# Patient Record
Sex: FEMALE | Race: WHITE | NOT HISPANIC OR LATINO
[De-identification: names, ages, dates, MRNs, and addresses within clinical notes are randomized per-mention and may not be internally consistent; named-entity substitution may affect disease eponyms.]

---

## 2017-04-24 ENCOUNTER — APPOINTMENT (OUTPATIENT)
Dept: SURGERY | Facility: CLINIC | Age: 37
End: 2017-04-24

## 2017-08-07 ENCOUNTER — APPOINTMENT (OUTPATIENT)
Dept: OTOLARYNGOLOGY | Facility: CLINIC | Age: 37
End: 2017-08-07

## 2018-04-16 ENCOUNTER — APPOINTMENT (OUTPATIENT)
Dept: SURGERY | Facility: CLINIC | Age: 38
End: 2018-04-16
Payer: COMMERCIAL

## 2018-04-16 PROCEDURE — 99214 OFFICE O/P EST MOD 30 MIN: CPT

## 2019-04-22 ENCOUNTER — APPOINTMENT (OUTPATIENT)
Dept: ENDOCRINOLOGY | Facility: CLINIC | Age: 39
End: 2019-04-22
Payer: COMMERCIAL

## 2019-04-22 VITALS
WEIGHT: 167 LBS | HEART RATE: 73 BPM | OXYGEN SATURATION: 99 % | DIASTOLIC BLOOD PRESSURE: 60 MMHG | BODY MASS INDEX: 28.51 KG/M2 | SYSTOLIC BLOOD PRESSURE: 104 MMHG | RESPIRATION RATE: 16 BRPM | HEIGHT: 64 IN

## 2019-04-22 PROCEDURE — 99214 OFFICE O/P EST MOD 30 MIN: CPT

## 2019-04-22 NOTE — ASSESSMENT
[FreeTextEntry1] : - cont synthroid 125mcg + trial of cytomel 5mcg qd. R+B reviewed\par - desired TSH range (0.1-0.5)\par - Thyroid bed US in 04/20, or sooner prn\par - tft's in 2 mos, along with Tg/Tg ab and reassess.

## 2019-04-22 NOTE — HISTORY OF PRESENT ILLNESS
[FreeTextEntry1] : 38 yo female f/u for hypothyroidism/PTC management. \par \par still feels tired, mood swings , feels depressed. inquiring re: cytomel/ t4+T3 combo. still  on WW, exercises, but gained 10 lbs over the year.\par uses CBD oil at bedtime, helps a bit with sleep\par \par still on synthroid 125mcg. Tirosint was expensive.\par \par Thyr US (4/3/19)- s/p total tx. no susp findings. stable Rt submandibular LN ~ 2.0 cm.\par Thyr US (4/4/18)- s/p total tx. no susp findings\par Thyr US (4/12/17)- s/p total tx. no susp findings\par \par \par no depression (was switched to OTC-lo, then back to generic). 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 Using retainers. Started using "Natural calm" (magnesium supplements) with good help. \par \par \par Seeing Dr. Birmingham yearly\par TSH - 0.07 <-- 0.02 <--0.04 <--0.02 <-- 0.09 <-- 0.02 <--0.03, \par FT4- 1.78 <-- 1.8 <--1.5 <--1.6 <-- 1.6 <-- 1.5 <--1.7\par neg tg/tg ab\par Labs (6/13/13)- TSH 0.02, FT4- 1.6, Neg Tg/Tg ab\par on 175 mcg of LT4 (brand).   \par \par \par Thyr bed US ( 4/13/16) - s/p total tx. no susp findings. Stable benign reactive 1.7cm LN in the Rt submandibular region.\par \par FNA (4/17/15) of Rt submandibular LN- reactive LN. \par Tg from LN aspirate < 1.0\par \par \par \par Thyr bed US (4/8/15)- several benign LN adjacent to the thyr bed. prominent 1.9cm LN in rt submandibular area (prev 1.5xm in 2013) \par Thyr bed US (4/8/14)- no suspicious lesions. No CELESTE\par Thyr bed US (4/16/13) - no residual/recurrent thyroid tissue.   prior Tg/Tg ab neg.   \par 25-D- wnl.   celiac panel is neg.   \par thyrogen stim WBS (6/21/13)- 0% uptake and undetectable stim Tg/ Tg ab\par \par  H/o:\par Diagnosed with PTC in 04/2012.   S/p total tx on 04/16/12 at [facility] (Dr. Birmingham).   S/p I- 131 ( 125mci ) at [facility] on 06/20/12.   The dose was fluctuating b/w 137- 175 mcg.   Denies family h/o thyroid cancer or history of radiation exposure to head and neck area in a childhood.   \par \par Path: multifocal -  Rt lobe 1.1cm well-diff  PTC with focal extrathyroidal extension, + LV invasion, + 3/7 LN;.   Rt lobe 0.6cm well-diff  PTC, no LV invasion, + extrathyroidal extension.   T3N1Mx.   \par \par Post treatment WBS (6/22/12)-  no evidence of distant mets.   Denies any c/o post ablation tx.

## 2019-04-23 ENCOUNTER — TRANSCRIPTION ENCOUNTER (OUTPATIENT)
Age: 39
End: 2019-04-23

## 2019-07-22 ENCOUNTER — APPOINTMENT (OUTPATIENT)
Dept: ENDOCRINOLOGY | Facility: CLINIC | Age: 39
End: 2019-07-22
Payer: COMMERCIAL

## 2019-07-22 VITALS
SYSTOLIC BLOOD PRESSURE: 102 MMHG | HEIGHT: 64 IN | DIASTOLIC BLOOD PRESSURE: 60 MMHG | BODY MASS INDEX: 28.68 KG/M2 | WEIGHT: 168 LBS | HEART RATE: 67 BPM | RESPIRATION RATE: 16 BRPM | OXYGEN SATURATION: 97 %

## 2019-07-22 PROCEDURE — 99214 OFFICE O/P EST MOD 30 MIN: CPT

## 2019-07-22 NOTE — HISTORY OF PRESENT ILLNESS
[FreeTextEntry1] : 38 yo female f/u for hypothyroidism/PTC management. \par \par ***7/22/19***\par took cytomel for 1 month. Felt much better, but had to stop b/o severe anxiety\par feels ok on synthroid 125mcg\par TSH- 0.446, FT4- 1.6, T3- 145\par Tg < 0.1\par \par \par ***4/22/19***\par still feels tired, mood swings , feels depressed. inquiring re: cytomel/ t4+T3 combo. still  on WW, exercises, but gained 10 lbs over the year.\par uses CBD oil at bedtime, helps a bit with sleep\par \par still on synthroid 125mcg. Tirosint was expensive.\par \par Thyr US (4/3/19)- s/p total tx. no susp findings. stable Rt submandibular LN ~ 2.0 cm.\par Thyr US (4/4/18)- s/p total tx. no susp findings\par Thyr US (4/12/17)- s/p total tx. no susp findings\par \par \par no depression (was switched to OTC-lo, then back to generic). 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 Using retainers. Started using "Natural calm" (magnesium supplements) with good help. \par \par \par Seeing Dr. Birmingham yearly\par TSH - 0.07 <-- 0.02 <--0.04 <--0.02 <-- 0.09 <-- 0.02 <--0.03, \par FT4- 1.78 <-- 1.8 <--1.5 <--1.6 <-- 1.6 <-- 1.5 <--1.7\par neg tg/tg ab\par Labs (6/13/13)- TSH 0.02, FT4- 1.6, Neg Tg/Tg ab\par on 175 mcg of LT4 (brand).   \par \par \par Thyr bed US ( 4/13/16) - s/p total tx. no susp findings. Stable benign reactive 1.7cm LN in the Rt submandibular region.\par \par FNA (4/17/15) of Rt submandibular LN- reactive LN. \par Tg from LN aspirate < 1.0\par \par \par \par Thyr bed US (4/8/15)- several benign LN adjacent to the thyr bed. prominent 1.9cm LN in rt submandibular area (prev 1.5xm in 2013) \par Thyr bed US (4/8/14)- no suspicious lesions. No CELESTE\par Thyr bed US (4/16/13) - no residual/recurrent thyroid tissue.   prior Tg/Tg ab neg.   \par 25-D- wnl.   celiac panel is neg.   \par thyrogen stim WBS (6/21/13)- 0% uptake and undetectable stim Tg/ Tg ab\par \par  H/o:\par Diagnosed with PTC in 04/2012.   S/p total tx on 04/16/12 at [facility] (Dr. Birmingham).   S/p I- 131 ( 125mci ) at [facility] on 06/20/12.   The dose was fluctuating b/w 137- 175 mcg.   Denies family h/o thyroid cancer or history of radiation exposure to head and neck area in a childhood.   \par \par Path: multifocal -  Rt lobe 1.1cm well-diff  PTC with focal extrathyroidal extension, + LV invasion, + 3/7 LN;.   Rt lobe 0.6cm well-diff  PTC, no LV invasion, + extrathyroidal extension.   T3N1Mx.   \par \par Post treatment WBS (6/22/12)-  no evidence of distant mets.   Denies any c/o post ablation tx.

## 2019-07-22 NOTE — ASSESSMENT
[Levothyroxine] : The patient was instructed to take Levothyroxine on an empty stomach, separate from vitamins, and wait at least 30 minutes before eating [FreeTextEntry1] : - discussed poss retrying cytomel and  lower dose of synthroid vs observing on current dose of synthroid\par - mutually agreed to stay synthroid 125mcg for now\par - she'll reconsider Tirosint (previously high cost)\par - desired TSH range (0.1-0.5)\par - Thyroid bed US in 04/20, or sooner prn\par RTC 6 mos, or sooner

## 2020-01-29 ENCOUNTER — RX RENEWAL (OUTPATIENT)
Age: 40
End: 2020-01-29

## 2020-07-27 ENCOUNTER — APPOINTMENT (OUTPATIENT)
Dept: ENDOCRINOLOGY | Facility: CLINIC | Age: 40
End: 2020-07-27
Payer: COMMERCIAL

## 2020-07-27 VITALS
RESPIRATION RATE: 16 BRPM | SYSTOLIC BLOOD PRESSURE: 110 MMHG | HEIGHT: 64 IN | WEIGHT: 172 LBS | OXYGEN SATURATION: 98 % | BODY MASS INDEX: 29.37 KG/M2 | DIASTOLIC BLOOD PRESSURE: 60 MMHG | HEART RATE: 86 BPM

## 2020-07-27 PROCEDURE — 99214 OFFICE O/P EST MOD 30 MIN: CPT

## 2020-07-27 RX ORDER — LIOTHYRONINE SODIUM 5 UG/1
5 TABLET ORAL DAILY
Qty: 30 | Refills: 5 | Status: DISCONTINUED | COMMUNITY
Start: 2019-04-22 | End: 2020-07-27

## 2020-07-27 NOTE — ASSESSMENT
[Levothyroxine] : The patient was instructed to take Levothyroxine on an empty stomach, separate from vitamins, and wait at least 30 minutes before eating [FreeTextEntry1] : - discussed poss retrying cytomel and  lower dose of synthroid vs observing on current dose of synthroid\par - mutually agreed to stay synthroid, but will incr the dose to 137mcg for now\par - she'll reconsider Tirosint (previously high cost)\par - desired TSH range (0.1-0.5)\par -  we discussed that her palpable neck LN is likely reactive in nature, but will do a neck US. If any further suspicions - for neck CT\par RTC 3 mos, or sooner

## 2020-07-27 NOTE — HISTORY OF PRESENT ILLNESS
[FreeTextEntry1] : 39 yo female f/u for hypothyroidism/PTC management. \par \par *** Jul 27, 2020 ***\par \par several weeks ago developed right jaw and neck pain, felt run down, then felt a large right posterior neck LN. SInce then LN got smaller \par on Synthroid 125 mcg\par TSH-1.68, FT4- 1.3\par neg Tg/Tg ab\par 25D- 38\par \par Thyr bed/neck US (6/8/20)- s/p total tx. no suspicious findings. Stable right submandibular LN (prev FNA'd)\par \par ***7/22/19***\par took cytomel for 1 month. Felt much better, but had to stop b/o severe anxiety\par feels ok on synthroid 125mcg\par TSH- 0.446, FT4- 1.6, T3- 145\par Tg < 0.1\par \par \par ***4/22/19***\par still feels tired, mood swings , feels depressed. inquiring re: cytomel/ t4+T3 combo. still  on WW, exercises, but gained 10 lbs over the year.\par uses CBD oil at bedtime, helps a bit with sleep\par \par still on synthroid 125mcg. Tirosint was expensive.\par \par Thyr US (4/3/19)- s/p total tx. no susp findings. stable Rt submandibular LN ~ 2.0 cm.\par Thyr US (4/4/18)- s/p total tx. no susp findings\par Thyr US (4/12/17)- s/p total tx. no susp findings\par \par \par no depression (was switched to OTC-lo, then back to generic). 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 Using retainers. Started using "Natural calm" (magnesium supplements) with good help. \par \par \par Seeing Dr. Birmingham yearly\par TSH - 0.07 <-- 0.02 <--0.04 <--0.02 <-- 0.09 <-- 0.02 <--0.03, \par FT4- 1.78 <-- 1.8 <--1.5 <--1.6 <-- 1.6 <-- 1.5 <--1.7\par neg tg/tg ab\par Labs (6/13/13)- TSH 0.02, FT4- 1.6, Neg Tg/Tg ab\par on 175 mcg of LT4 (brand).   \par \par \par Thyr bed US ( 4/13/16) - s/p total tx. no susp findings. Stable benign reactive 1.7cm LN in the Rt submandibular region.\par \par FNA (4/17/15) of Rt submandibular LN- reactive LN. \par Tg from LN aspirate < 1.0\par \par \par \par Thyr bed US (4/8/15)- several benign LN adjacent to the thyr bed. prominent 1.9cm LN in rt submandibular area (prev 1.5xm in 2013) \par Thyr bed US (4/8/14)- no suspicious lesions. No CELESTE\par Thyr bed US (4/16/13) - no residual/recurrent thyroid tissue.   prior Tg/Tg ab neg.   \par 25-D- wnl.   celiac panel is neg.   \par thyrogen stim WBS (6/21/13)- 0% uptake and undetectable stim Tg/ Tg ab\par \par  H/o:\par Diagnosed with PTC in 04/2012.   S/p total tx on 04/16/12 at [facility] (Dr. Birmingham).   S/p I- 131 ( 125mci ) at [facility] on 06/20/12.   The dose was fluctuating b/w 137- 175 mcg.   Denies family h/o thyroid cancer or history of radiation exposure to head and neck area in a childhood.   \par \par Path: multifocal -  Rt lobe 1.1cm well-diff  PTC with focal extrathyroidal extension, + LV invasion, + 3/7 LN;.   Rt lobe 0.6cm well-diff  PTC, no LV invasion, + extrathyroidal extension.   T3N1Mx.   \par \par Post treatment WBS (6/22/12)-  no evidence of distant mets.   Denies any c/o post ablation tx.

## 2020-07-28 ENCOUNTER — TRANSCRIPTION ENCOUNTER (OUTPATIENT)
Age: 40
End: 2020-07-28

## 2020-09-08 ENCOUNTER — TRANSCRIPTION ENCOUNTER (OUTPATIENT)
Age: 40
End: 2020-09-08

## 2020-10-01 ENCOUNTER — TRANSCRIPTION ENCOUNTER (OUTPATIENT)
Age: 40
End: 2020-10-01

## 2020-10-22 ENCOUNTER — APPOINTMENT (OUTPATIENT)
Dept: SURGERY | Facility: CLINIC | Age: 40
End: 2020-10-22
Payer: COMMERCIAL

## 2020-10-22 PROCEDURE — 99213 OFFICE O/P EST LOW 20 MIN: CPT

## 2020-10-22 NOTE — HISTORY OF PRESENT ILLNESS
[de-identified] : Patient diagnosed with papillary thyroid cancer April 2012 with pathology 1.1 cm, 3 of 7 lymph nodes positive for metastatic disease, T3N1. Patient denies dysphasia, hoarseness, fatigue or palpitations. Head neck ultrasound April 2015 with right submandibular lymph node biopsy negative. No additional imaging performed. Blood work from Dr. Burks : TSH 0.04, free T4 1.5, thyroglobulin negative without antibodies. Patient denies recent illness.\par Patient had neck ultrasound this morning with submandibular lymph node decreased to 1.5. repeat last week 2 cm without suspicious findings,   Blood work from last week normal with negative TG. Patient denies dysphagia, palpitations, or recent illness. Patient reports mild fatigue and difficulty loosing weight.  Currently on Synthroid 125 . seeing Dr Burks next week will discuss possible cytomel. \par Patient with right jaw/cheek discomfort 7/2020.  US 7/2020 with right level 5 LN, decreased on US 10/2020 probable reactive. had min last week pending.  feeling well. reports prior TGs negative.

## 2020-10-22 NOTE — PHYSICAL EXAM
[de-identified] : right posterior  cervical adenopathy flat, soft, non tender, trachea midline, no palpable masses. [Normal] : orientation to person, place, and time: normal

## 2020-10-23 ENCOUNTER — TRANSCRIPTION ENCOUNTER (OUTPATIENT)
Age: 40
End: 2020-10-23

## 2020-10-26 ENCOUNTER — APPOINTMENT (OUTPATIENT)
Dept: SURGERY | Facility: CLINIC | Age: 40
End: 2020-10-26

## 2020-10-27 ENCOUNTER — APPOINTMENT (OUTPATIENT)
Dept: ENDOCRINOLOGY | Facility: CLINIC | Age: 40
End: 2020-10-27
Payer: COMMERCIAL

## 2020-10-27 PROCEDURE — 99214 OFFICE O/P EST MOD 30 MIN: CPT | Mod: 95

## 2020-10-27 NOTE — ASSESSMENT
[Levothyroxine] : The patient was instructed to take Levothyroxine on an empty stomach, separate from vitamins, and wait at least 30 minutes before eating [FreeTextEntry1] : - discussed poss retrying cytomel and  lower dose of synthroid vs observing on current dose of synthroid\par - mutually agreed to stay synthroid, change to 137mcg 6.5/wk\par - she'll reconsider Tirosint (previously high cost)\par - desired TSH range (0.1-0.5)\par -palpable neck LN are likely reactive in nature, rpt neck US in 6 mos. If any further suspicions - for neck CT\par RTC 6 mos, or sooner

## 2020-10-27 NOTE — HISTORY OF PRESENT ILLNESS
[Home] : at home, [unfilled] , at the time of the visit. [Medical Office: ([facility])___] : at the medical office located in  [Verbal consent obtained from patient] : the patient, [unfilled] [FreeTextEntry1] : 41 yo female f/u for hypothyroidism/PTC management. \par \par *** Televisit  Oct 27, 2020 ***\par \par on Synthroid 137 mcg. feels well.\par TSH- 0.12, FT4- 1.4, neg Tg ab (no Tg are run)\par \par Thyr US (10/16/20)- right posterior cervical chain LN 1.1 +0.8 +1.0 LN with fatty hilum. Smaller in size and c/w reactive CELESTE. previously biopsied right submandibular LN is also unchanged\par \par *** Jul 27, 2020 ***\par \par several weeks ago developed right jaw and neck pain, felt run down, then felt a large right posterior neck LN. SInce then LN got smaller \par on Synthroid 125 mcg\par TSH-1.68, FT4- 1.3\par neg Tg/Tg ab\par 25D- 38\par \par Thyr bed/neck US (6/8/20)- s/p total tx. no suspicious findings. Stable right submandibular LN (prev FNA'd)\par \par ***7/22/19***\par took cytomel for 1 month. Felt much better, but had to stop b/o severe anxiety\par feels ok on synthroid 125mcg\par TSH- 0.446, FT4- 1.6, T3- 145\par Tg < 0.1\par \par \par ***4/22/19***\par still feels tired, mood swings , feels depressed. inquiring re: cytomel/ t4+T3 combo. still  on WW, exercises, but gained 10 lbs over the year.\par uses CBD oil at bedtime, helps a bit with sleep\par \par still on synthroid 125mcg. Tirosint was expensive.\par \par Thyr US (4/3/19)- s/p total tx. no susp findings. stable Rt submandibular LN ~ 2.0 cm.\par Thyr US (4/4/18)- s/p total tx. no susp findings\par Thyr US (4/12/17)- s/p total tx. no susp findings\par \par \par no depression (was switched to OTC-lo, then back to generic). 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 Using retainers. Started using "Natural calm" (magnesium supplements) with good help. \par \par \par Seeing Dr. Birmingham yearly\par TSH - 0.07 <-- 0.02 <--0.04 <--0.02 <-- 0.09 <-- 0.02 <--0.03, \par FT4- 1.78 <-- 1.8 <--1.5 <--1.6 <-- 1.6 <-- 1.5 <--1.7\par neg tg/tg ab\par Labs (6/13/13)- TSH 0.02, FT4- 1.6, Neg Tg/Tg ab\par on 175 mcg of LT4 (brand).   \par \par \par Thyr bed US ( 4/13/16) - s/p total tx. no susp findings. Stable benign reactive 1.7cm LN in the Rt submandibular region.\par \par FNA (4/17/15) of Rt submandibular LN- reactive LN. \par Tg from LN aspirate < 1.0\par \par \par \par Thyr bed US (4/8/15)- several benign LN adjacent to the thyr bed. prominent 1.9cm LN in rt submandibular area (prev 1.5xm in 2013) \par Thyr bed US (4/8/14)- no suspicious lesions. No CELESTE\par Thyr bed US (4/16/13) - no residual/recurrent thyroid tissue.   prior Tg/Tg ab neg.   \par 25-D- wnl.   celiac panel is neg.   \par thyrogen stim WBS (6/21/13)- 0% uptake and undetectable stim Tg/ Tg ab\par \par  H/o:\par Diagnosed with PTC in 04/2012.   S/p total tx on 04/16/12 at [facility] (Dr. Birmingham).   S/p I- 131 ( 125mci ) at [facility] on 06/20/12.   The dose was fluctuating b/w 137- 175 mcg.   Denies family h/o thyroid cancer or history of radiation exposure to head and neck area in a childhood.   \par \par Path: multifocal -  Rt lobe 1.1cm well-diff  PTC with focal extrathyroidal extension, + LV invasion, + 3/7 LN;.   Rt lobe 0.6cm well-diff  PTC, no LV invasion, + extrathyroidal extension.   T3N1Mx.   \par \par Post treatment WBS (6/22/12)-  no evidence of distant mets.   Denies any c/o post ablation tx.

## 2021-04-19 ENCOUNTER — TRANSCRIPTION ENCOUNTER (OUTPATIENT)
Age: 41
End: 2021-04-19

## 2021-04-23 ENCOUNTER — TRANSCRIPTION ENCOUNTER (OUTPATIENT)
Age: 41
End: 2021-04-23

## 2021-04-23 DIAGNOSIS — Z00.00 ENCOUNTER FOR GENERAL ADULT MEDICAL EXAMINATION W/OUT ABNORMAL FINDINGS: ICD-10-CM

## 2021-05-13 ENCOUNTER — TRANSCRIPTION ENCOUNTER (OUTPATIENT)
Age: 41
End: 2021-05-13

## 2021-06-23 ENCOUNTER — TRANSCRIPTION ENCOUNTER (OUTPATIENT)
Age: 41
End: 2021-06-23

## 2021-07-13 ENCOUNTER — APPOINTMENT (OUTPATIENT)
Dept: ENDOCRINOLOGY | Facility: CLINIC | Age: 41
End: 2021-07-13
Payer: COMMERCIAL

## 2021-07-13 VITALS
DIASTOLIC BLOOD PRESSURE: 62 MMHG | OXYGEN SATURATION: 98 % | TEMPERATURE: 97.9 F | WEIGHT: 177 LBS | HEIGHT: 64 IN | BODY MASS INDEX: 30.22 KG/M2 | HEART RATE: 67 BPM | RESPIRATION RATE: 16 BRPM | SYSTOLIC BLOOD PRESSURE: 100 MMHG

## 2021-07-13 PROCEDURE — 99214 OFFICE O/P EST MOD 30 MIN: CPT

## 2021-07-13 PROCEDURE — 99072 ADDL SUPL MATRL&STAF TM PHE: CPT

## 2021-07-13 NOTE — HISTORY OF PRESENT ILLNESS
[FreeTextEntry1] : 40 yo female f/u for hypothyroidism/PTC management. \par \par *** Jul 13, 2021 ***\par \par on Synthroid 137 mcg  6.5/wk\par TSH- 0.54, FT4- 1.5\par neg Tg/Tg ab\par \par Thyr US (6/18/21)- 2.1 cm mid right lateral neck region\par s/p FNA (6/24/21)- favoring reactive lymph node\par \par \par *** Televisit  Oct 27, 2020 ***\par \par on Synthroid 137 mcg. feels well.\par TSH- 0.12, FT4- 1.4, neg Tg ab (no Tg are run)\par \par Thyr US (10/16/20)- right posterior cervical chain LN 1.1 +0.8 +1.0 LN with fatty hilum. Smaller in size and c/w reactive CELESTE. previously biopsied right submandibular LN is also unchanged\par \par *** Jul 27, 2020 ***\par \par several weeks ago developed right jaw and neck pain, felt run down, then felt a large right posterior neck LN. SInce then LN got smaller \par on Synthroid 125 mcg\par TSH-1.68, FT4- 1.3\par neg Tg/Tg ab\par 25D- 38\par \par Thyr bed/neck US (6/8/20)- s/p total tx. no suspicious findings. Stable right submandibular LN (prev FNA'd)\par \par ***7/22/19***\par took cytomel for 1 month. Felt much better, but had to stop b/o severe anxiety\par feels ok on synthroid 125mcg\par TSH- 0.446, FT4- 1.6, T3- 145\par Tg < 0.1\par \par \par ***4/22/19***\par still feels tired, mood swings , feels depressed. inquiring re: cytomel/ t4+T3 combo. still  on WW, exercises, but gained 10 lbs over the year.\par uses CBD oil at bedtime, helps a bit with sleep\par \par still on synthroid 125mcg. Tirosint was expensive.\par \par Thyr US (4/3/19)- s/p total tx. no susp findings. stable Rt submandibular LN ~ 2.0 cm.\par Thyr US (4/4/18)- s/p total tx. no susp findings\par Thyr US (4/12/17)- s/p total tx. no susp findings\par \par \par no depression (was switched to OTC-lo, then back to generic). 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 Using retainers. Started using "Natural calm" (magnesium supplements) with good help. \par \par \par Seeing Dr. Birmingham yearly\par TSH - 0.07 <-- 0.02 <--0.04 <--0.02 <-- 0.09 <-- 0.02 <--0.03, \par FT4- 1.78 <-- 1.8 <--1.5 <--1.6 <-- 1.6 <-- 1.5 <--1.7\par neg tg/tg ab\par Labs (6/13/13)- TSH 0.02, FT4- 1.6, Neg Tg/Tg ab\par on 175 mcg of LT4 (brand).   \par \par \par Thyr bed US ( 4/13/16) - s/p total tx. no susp findings. Stable benign reactive 1.7cm LN in the Rt submandibular region.\par \par FNA (4/17/15) of Rt submandibular LN- reactive LN. \par Tg from LN aspirate < 1.0\par \par \par \par Thyr bed US (4/8/15)- several benign LN adjacent to the thyr bed. prominent 1.9cm LN in rt submandibular area (prev 1.5xm in 2013) \par Thyr bed US (4/8/14)- no suspicious lesions. No CELESTE\par Thyr bed US (4/16/13) - no residual/recurrent thyroid tissue.   prior Tg/Tg ab neg.   \par 25-D- wnl.   celiac panel is neg.   \par thyrogen stim WBS (6/21/13)- 0% uptake and undetectable stim Tg/ Tg ab\par \par  H/o:\par Diagnosed with PTC in 04/2012.   S/p total tx on 04/16/12 at [facility] (Dr. Birmingham).   S/p I- 131 ( 125mci ) at [facility] on 06/20/12.   The dose was fluctuating b/w 137- 175 mcg.   Denies family h/o thyroid cancer or history of radiation exposure to head and neck area in a childhood.   \par \par Path: multifocal -  Rt lobe 1.1cm well-diff  PTC with focal extrathyroidal extension, + LV invasion, + 3/7 LN;.   Rt lobe 0.6cm well-diff  PTC, no LV invasion, + extrathyroidal extension.   T3N1Mx.   \par \par Post treatment WBS (6/22/12)-  no evidence of distant mets.   Denies any c/o post ablation tx.

## 2021-07-13 NOTE — ASSESSMENT
[Levothyroxine] : The patient was instructed to take Levothyroxine on an empty stomach, separate from vitamins, and wait at least 30 minutes before eating [FreeTextEntry1] : - discussed poss retrying cytomel and  lower dose of synthroid vs observing on current dose of synthroid\par - mutually agreed to keep synthroid 137mcg 6.5/wk\par - she'll reconsider Tirosint (previously high cost)\par - desired TSH range (0.1-0.5)\par -palpable neck LN are likely reactive in nature, rpt neck US in 6 mos. If any further suspicions - for neck CT\par - f/u Dr. Birmingham\par RTC 6 mos, or sooner

## 2021-08-03 ENCOUNTER — APPOINTMENT (OUTPATIENT)
Dept: SURGERY | Facility: CLINIC | Age: 41
End: 2021-08-03
Payer: COMMERCIAL

## 2021-08-03 PROCEDURE — 99213 OFFICE O/P EST LOW 20 MIN: CPT

## 2021-08-03 NOTE — ASSESSMENT
[FreeTextEntry1] : h/o thyroid cancer and recent  reactive LNs, no suspicious findings on PE.f/u US 12/2021   ,  RTO  6 mo

## 2021-08-03 NOTE — HISTORY OF PRESENT ILLNESS
[de-identified] : Patient diagnosed with papillary thyroid cancer April 2012 with pathology 1.1 cm, 3 of 7 lymph nodes positive for metastatic disease, T3N1. Patient denies dysphasia, hoarseness, fatigue or palpitations. Head neck ultrasound April 2015 with right submandibular lymph node biopsy negative. No additional imaging performed. Blood work from Dr. Burks : TSH 0.04, free T4 1.5, thyroglobulin negative without antibodies. Patient denies recent illness.\par Patient had neck ultrasound this morning with submandibular lymph node decreased to 1.5. repeat last week 2 cm without suspicious findings,   Blood work from last week normal with negative TG. Patient denies dysphagia, palpitations, or recent illness. Patient reports mild fatigue and difficulty loosing weight.  Currently on Synthroid 125 . seeing Dr Burks next week will discuss possible cytomel. \par Patient with right jaw/cheek discomfort 7/2020.  US 7/2020 with right level 5 LN, decreased on US 10/2020 probable reactive. had min 6/2021 in good range,   feeling well. neck US 6/2021 with right level 3 LN , FNA benign per patient. will obtain report.  I have reviewed all old and new data and available images.

## 2021-08-03 NOTE — PHYSICAL EXAM
[de-identified] : right posterior  cervical adenopathy flat, soft, non tender, trachea midline, no palpable masses. [Normal] : orientation to person, place, and time: normal

## 2021-08-26 ENCOUNTER — TRANSCRIPTION ENCOUNTER (OUTPATIENT)
Age: 41
End: 2021-08-26

## 2021-11-24 ENCOUNTER — TRANSCRIPTION ENCOUNTER (OUTPATIENT)
Age: 41
End: 2021-11-24

## 2021-12-05 ENCOUNTER — TRANSCRIPTION ENCOUNTER (OUTPATIENT)
Age: 41
End: 2021-12-05

## 2022-01-13 ENCOUNTER — APPOINTMENT (OUTPATIENT)
Dept: ENDOCRINOLOGY | Facility: CLINIC | Age: 42
End: 2022-01-13
Payer: COMMERCIAL

## 2022-01-13 VITALS
RESPIRATION RATE: 16 BRPM | TEMPERATURE: 98.7 F | BODY MASS INDEX: 29.53 KG/M2 | WEIGHT: 173 LBS | OXYGEN SATURATION: 98 % | HEIGHT: 64 IN | SYSTOLIC BLOOD PRESSURE: 120 MMHG | HEART RATE: 72 BPM | DIASTOLIC BLOOD PRESSURE: 60 MMHG

## 2022-01-13 DIAGNOSIS — R59.0 LOCALIZED ENLARGED LYMPH NODES: ICD-10-CM

## 2022-01-13 PROCEDURE — 99214 OFFICE O/P EST MOD 30 MIN: CPT

## 2022-01-13 NOTE — HISTORY OF PRESENT ILLNESS
[FreeTextEntry1] : 42 yo female f/u for hypothyroidism/PTC management. \par \par *** Jan 13, 2022 ***\par \par on Synthroid 137 mcg  6.5/wk\par TSH- 0.53,  FT4- 1.5, neg tg/tg ab\par no repeat thyroid US yet\par \par *** Jul 13, 2021 ***\par \par on Synthroid 137 mcg  6.5/wk\par TSH- 0.54, FT4- 1.5\par neg Tg/Tg ab\par \par Thyr US (6/18/21)- 2.1 cm mid right lateral neck region\par s/p FNA (6/24/21)- favoring reactive lymph node\par \par \par *** Televisit  Oct 27, 2020 ***\par \par on Synthroid 137 mcg. feels well.\par TSH- 0.12, FT4- 1.4, neg Tg ab (no Tg are run)\par \par Thyr US (10/16/20)- right posterior cervical chain LN 1.1 +0.8 +1.0 LN with fatty hilum. Smaller in size and c/w reactive CELESTE. previously biopsied right submandibular LN is also unchanged\par \par *** Jul 27, 2020 ***\par \par several weeks ago developed right jaw and neck pain, felt run down, then felt a large right posterior neck LN. SInce then LN got smaller \par on Synthroid 125 mcg\par TSH-1.68, FT4- 1.3\par neg Tg/Tg ab\par 25D- 38\par \par Thyr bed/neck US (6/8/20)- s/p total tx. no suspicious findings. Stable right submandibular LN (prev FNA'd)\par \par ***7/22/19***\par took cytomel for 1 month. Felt much better, but had to stop b/o severe anxiety\par feels ok on synthroid 125mcg\par TSH- 0.446, FT4- 1.6, T3- 145\par Tg < 0.1\par \par \par ***4/22/19***\par still feels tired, mood swings , feels depressed. inquiring re: cytomel/ t4+T3 combo. still  on WW, exercises, but gained 10 lbs over the year.\par uses CBD oil at bedtime, helps a bit with sleep\par \par still on synthroid 125mcg. Tirosint was expensive.\par \par Thyr US (4/3/19)- s/p total tx. no susp findings. stable Rt submandibular LN ~ 2.0 cm.\par Thyr US (4/4/18)- s/p total tx. no susp findings\par Thyr US (4/12/17)- s/p total tx. no susp findings\par \par \par no depression (was switched to OTC-lo, then back to generic). 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 Using retainers. Started using "Natural calm" (magnesium supplements) with good help. \par \par \par Seeing Dr. Birmingham yearly\par TSH - 0.07 <-- 0.02 <--0.04 <--0.02 <-- 0.09 <-- 0.02 <--0.03, \par FT4- 1.78 <-- 1.8 <--1.5 <--1.6 <-- 1.6 <-- 1.5 <--1.7\par neg tg/tg ab\par Labs (6/13/13)- TSH 0.02, FT4- 1.6, Neg Tg/Tg ab\par on 175 mcg of LT4 (brand).   \par \par \par Thyr bed US ( 4/13/16) - s/p total tx. no susp findings. Stable benign reactive 1.7cm LN in the Rt submandibular region.\par \par FNA (4/17/15) of Rt submandibular LN- reactive LN. \par Tg from LN aspirate < 1.0\par \par \par \par Thyr bed US (4/8/15)- several benign LN adjacent to the thyr bed. prominent 1.9cm LN in rt submandibular area (prev 1.5xm in 2013) \par Thyr bed US (4/8/14)- no suspicious lesions. No CELESTE\par Thyr bed US (4/16/13) - no residual/recurrent thyroid tissue.   prior Tg/Tg ab neg.   \par 25-D- wnl.   celiac panel is neg.   \par thyrogen stim WBS (6/21/13)- 0% uptake and undetectable stim Tg/ Tg ab\par \par  H/o:\par Diagnosed with PTC in 04/2012.   S/p total tx on 04/16/12 at [facility] (Dr. Birmingham).   S/p I- 131 ( 125mci ) at [facility] on 06/20/12.   The dose was fluctuating b/w 137- 175 mcg.   Denies family h/o thyroid cancer or history of radiation exposure to head and neck area in a childhood.   \par \par Path: multifocal -  Rt lobe 1.1cm well-diff  PTC with focal extrathyroidal extension, + LV invasion, + 3/7 LN;.   Rt lobe 0.6cm well-diff  PTC, no LV invasion, + extrathyroidal extension.   T3N1Mx.   \par \par Post treatment WBS (6/22/12)-  no evidence of distant mets.   Denies any c/o post ablation tx.

## 2022-01-13 NOTE — ASSESSMENT
[Levothyroxine] : The patient was instructed to take Levothyroxine on an empty stomach, separate from vitamins, and wait at least 30 minutes before eating [FreeTextEntry1] : - discussed poss retrying cytomel and  lower dose of synthroid vs observing on current dose of synthroid\par - mutually agreed to keep synthroid 137mcg 6.5/wk\par - she'll reconsider Tirosint (previously high cost)\par - desired TSH range (0.1-0.5)\par -palpable neck LN are likely reactive in nature, rpt neck US this month . If any further suspicions - for neck CT\par RTC 6 mos, or sooner

## 2022-07-15 ENCOUNTER — TRANSCRIPTION ENCOUNTER (OUTPATIENT)
Age: 42
End: 2022-07-15

## 2022-07-18 ENCOUNTER — TRANSCRIPTION ENCOUNTER (OUTPATIENT)
Age: 42
End: 2022-07-18

## 2022-07-27 ENCOUNTER — TRANSCRIPTION ENCOUNTER (OUTPATIENT)
Age: 42
End: 2022-07-27

## 2022-09-15 ENCOUNTER — APPOINTMENT (OUTPATIENT)
Dept: ENDOCRINOLOGY | Facility: CLINIC | Age: 42
End: 2022-09-15

## 2022-09-15 VITALS
WEIGHT: 168 LBS | RESPIRATION RATE: 16 BRPM | TEMPERATURE: 98 F | HEIGHT: 64 IN | BODY MASS INDEX: 28.68 KG/M2 | SYSTOLIC BLOOD PRESSURE: 114 MMHG | HEART RATE: 80 BPM | DIASTOLIC BLOOD PRESSURE: 70 MMHG | OXYGEN SATURATION: 98 %

## 2022-09-15 PROCEDURE — 99214 OFFICE O/P EST MOD 30 MIN: CPT

## 2022-09-15 NOTE — ASSESSMENT
[Levothyroxine] : The patient was instructed to take Levothyroxine on an empty stomach, separate from vitamins, and wait at least 30 minutes before eating [FreeTextEntry1] : - discussed poss retrying cytomel and  lower dose of synthroid vs observing on current dose of synthroid\par - decr synthroid 137 mcg 6/wk and recheck labs in 2 mos. change of the dose is likely due to the weight loss and coming off OCP's\par - she'll reconsider Tirosint (previously high cost)\par - desired TSH range (0.1-0.5)\par -palpable neck LN are likely reactive in nature, rpt neck US in 7/23 . If any further suspicions - for neck CT\par RTC 6 mos, or sooner

## 2022-09-15 NOTE — HISTORY OF PRESENT ILLNESS
[FreeTextEntry1] : 43 yo female f/u for hypothyroidism/PTC management. \par \par *** Sep 15, 2022 ***\par \par feels ok overall. started iron pills for anemia. off OCP's since 02/22, cycle is irregular now, heavier recently. \par on Synthroid 137 mcg  6.5/wk\par TSH-0.04, FT4- 1.6\par Thyr US (7/29/22)- stable prev bx'ed nodule in the right mid lateral neck region\par \par *** Jan 13, 2022 ***\par \par on Synthroid 137 mcg  6.5/wk\par TSH- 0.53,  FT4- 1.5, neg tg/tg ab\par no repeat thyroid US yet\par \par *** Jul 13, 2021 ***\par \par on Synthroid 137 mcg  6.5/wk\par TSH- 0.54, FT4- 1.5\par neg Tg/Tg ab\par \par Thyr US (6/18/21)- 2.1 cm mid right lateral neck region\par s/p FNA (6/24/21)- favoring reactive lymph node\par \par \par *** Televisit  Oct 27, 2020 ***\par \par on Synthroid 137 mcg. feels well.\par TSH- 0.12, FT4- 1.4, neg Tg ab (no Tg are run)\par \par Thyr US (10/16/20)- right posterior cervical chain LN 1.1 +0.8 +1.0 LN with fatty hilum. Smaller in size and c/w reactive CELESTE. previously biopsied right submandibular LN is also unchanged\par \par *** Jul 27, 2020 ***\par \par several weeks ago developed right jaw and neck pain, felt run down, then felt a large right posterior neck LN. SInce then LN got smaller \par on Synthroid 125 mcg\par TSH-1.68, FT4- 1.3\par neg Tg/Tg ab\par 25D- 38\par \par Thyr bed/neck US (6/8/20)- s/p total tx. no suspicious findings. Stable right submandibular LN (prev FNA'd)\par \par ***7/22/19***\par took cytomel for 1 month. Felt much better, but had to stop b/o severe anxiety\par feels ok on synthroid 125mcg\par TSH- 0.446, FT4- 1.6, T3- 145\par Tg < 0.1\par \par \par ***4/22/19***\par still feels tired, mood swings , feels depressed. inquiring re: cytomel/ t4+T3 combo. still  on WW, exercises, but gained 10 lbs over the year.\par uses CBD oil at bedtime, helps a bit with sleep\par \par still on synthroid 125mcg. Tirosint was expensive.\par \par Thyr US (4/3/19)- s/p total tx. no susp findings. stable Rt submandibular LN ~ 2.0 cm.\par Thyr US (4/4/18)- s/p total tx. no susp findings\par Thyr US (4/12/17)- s/p total tx. no susp findings\par \par \par no depression (was switched to OTC-lo, then back to generic). 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 Using retainers. Started using "Natural calm" (magnesium supplements) with good help. \par \par \par Seeing Dr. Birmingham yearly\par TSH - 0.07 <-- 0.02 <--0.04 <--0.02 <-- 0.09 <-- 0.02 <--0.03, \par FT4- 1.78 <-- 1.8 <--1.5 <--1.6 <-- 1.6 <-- 1.5 <--1.7\par neg tg/tg ab\par Labs (6/13/13)- TSH 0.02, FT4- 1.6, Neg Tg/Tg ab\par on 175 mcg of LT4 (brand).   \par \par \par Thyr bed US ( 4/13/16) - s/p total tx. no susp findings. Stable benign reactive 1.7cm LN in the Rt submandibular region.\par \par FNA (4/17/15) of Rt submandibular LN- reactive LN. \par Tg from LN aspirate < 1.0\par \par \par \par Thyr bed US (4/8/15)- several benign LN adjacent to the thyr bed. prominent 1.9cm LN in rt submandibular area (prev 1.5xm in 2013) \par Thyr bed US (4/8/14)- no suspicious lesions. No CELESTE\par Thyr bed US (4/16/13) - no residual/recurrent thyroid tissue.   prior Tg/Tg ab neg.   \par 25-D- wnl.   celiac panel is neg.   \par thyrogen stim WBS (6/21/13)- 0% uptake and undetectable stim Tg/ Tg ab\par \par  H/o:\par Diagnosed with PTC in 04/2012.   S/p total tx on 04/16/12 at [facility] (Dr. Birmingham).   S/p I- 131 ( 125mci ) at [facility] on 06/20/12.   The dose was fluctuating b/w 137- 175 mcg.   Denies family h/o thyroid cancer or history of radiation exposure to head and neck area in a childhood.   \par \par Path: multifocal -  Rt lobe 1.1cm well-diff  PTC with focal extrathyroidal extension, + LV invasion, + 3/7 LN;.   Rt lobe 0.6cm well-diff  PTC, no LV invasion, + extrathyroidal extension.   T3N1Mx.   \par \par Post treatment WBS (6/22/12)-  no evidence of distant mets.   Denies any c/o post ablation tx.

## 2022-11-29 ENCOUNTER — TRANSCRIPTION ENCOUNTER (OUTPATIENT)
Age: 42
End: 2022-11-29

## 2022-11-30 ENCOUNTER — TRANSCRIPTION ENCOUNTER (OUTPATIENT)
Age: 42
End: 2022-11-30

## 2022-12-14 ENCOUNTER — TRANSCRIPTION ENCOUNTER (OUTPATIENT)
Age: 42
End: 2022-12-14

## 2023-01-26 ENCOUNTER — TRANSCRIPTION ENCOUNTER (OUTPATIENT)
Age: 43
End: 2023-01-26

## 2023-02-23 ENCOUNTER — APPOINTMENT (OUTPATIENT)
Dept: ENDOCRINOLOGY | Facility: CLINIC | Age: 43
End: 2023-02-23
Payer: COMMERCIAL

## 2023-02-23 VITALS
BODY MASS INDEX: 28.1 KG/M2 | TEMPERATURE: 97.3 F | HEIGHT: 64 IN | WEIGHT: 164.6 LBS | HEART RATE: 64 BPM | SYSTOLIC BLOOD PRESSURE: 118 MMHG | RESPIRATION RATE: 16 BRPM | OXYGEN SATURATION: 98 % | DIASTOLIC BLOOD PRESSURE: 62 MMHG

## 2023-02-23 PROCEDURE — 99214 OFFICE O/P EST MOD 30 MIN: CPT

## 2023-02-23 NOTE — HISTORY OF PRESENT ILLNESS
[FreeTextEntry1] : 43 yo female f/u for hypothyroidism/PTC management. \par \par *** Feb 23, 2023 ***\par \par feels well on Synthroid 112 mcg. works out a lot. lost more weight\par staying off OCPs. also, on vitamin D3 2,000 IU/day\par TSH- 0.04, FT4- 1.6, 25D- 28, neg Tg/Tg ab\par \par *** Sep 15, 2022 ***\par \par feels ok overall. started iron pills for anemia. off OCP's since 02/22, cycle is irregular now, heavier recently. \par on Synthroid 137 mcg  6.5/wk\par TSH-0.04, FT4- 1.6\par Thyr US (7/29/22)- stable prev bx'ed nodule in the right mid lateral neck region\par \par *** Jan 13, 2022 ***\par \par on Synthroid 137 mcg  6.5/wk\par TSH- 0.53,  FT4- 1.5, neg tg/tg ab\par no repeat thyroid US yet\par \par *** Jul 13, 2021 ***\par \par on Synthroid 137 mcg  6.5/wk\par TSH- 0.54, FT4- 1.5\par neg Tg/Tg ab\par \par Thyr US (6/18/21)- 2.1 cm mid right lateral neck region\par s/p FNA (6/24/21)- favoring reactive lymph node\par \par \par *** Televisit  Oct 27, 2020 ***\par \par on Synthroid 137 mcg. feels well.\par TSH- 0.12, FT4- 1.4, neg Tg ab (no Tg are run)\par \par Thyr US (10/16/20)- right posterior cervical chain LN 1.1 +0.8 +1.0 LN with fatty hilum. Smaller in size and c/w reactive CELESTE. previously biopsied right submandibular LN is also unchanged\par \par *** Jul 27, 2020 ***\par \par several weeks ago developed right jaw and neck pain, felt run down, then felt a large right posterior neck LN. SInce then LN got smaller \par on Synthroid 125 mcg\par TSH-1.68, FT4- 1.3\par neg Tg/Tg ab\par 25D- 38\par \par Thyr bed/neck US (6/8/20)- s/p total tx. no suspicious findings. Stable right submandibular LN (prev FNA'd)\par \par ***7/22/19***\par took cytomel for 1 month. Felt much better, but had to stop b/o severe anxiety\par feels ok on synthroid 125mcg\par TSH- 0.446, FT4- 1.6, T3- 145\par Tg < 0.1\par \par \par ***4/22/19***\par still feels tired, mood swings , feels depressed. inquiring re: cytomel/ t4+T3 combo. still  on WW, exercises, but gained 10 lbs over the year.\par uses CBD oil at bedtime, helps a bit with sleep\par \par still on synthroid 125mcg. Tirosint was expensive.\par \par Thyr US (4/3/19)- s/p total tx. no susp findings. stable Rt submandibular LN ~ 2.0 cm.\par Thyr US (4/4/18)- s/p total tx. no susp findings\par Thyr US (4/12/17)- s/p total tx. no susp findings\par \par \par no depression (was switched to OTC-lo, then back to generic). 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 Using retainers. Started using "Natural calm" (magnesium supplements) with good help. \par \par \par Seeing Dr. Birmingham yearly\par TSH - 0.07 <-- 0.02 <--0.04 <--0.02 <-- 0.09 <-- 0.02 <--0.03, \par FT4- 1.78 <-- 1.8 <--1.5 <--1.6 <-- 1.6 <-- 1.5 <--1.7\par neg tg/tg ab\par Labs (6/13/13)- TSH 0.02, FT4- 1.6, Neg Tg/Tg ab\par on 175 mcg of LT4 (brand).   \par \par \par Thyr bed US ( 4/13/16) - s/p total tx. no susp findings. Stable benign reactive 1.7cm LN in the Rt submandibular region.\par \par FNA (4/17/15) of Rt submandibular LN- reactive LN. \par Tg from LN aspirate < 1.0\par \par \par \par Thyr bed US (4/8/15)- several benign LN adjacent to the thyr bed. prominent 1.9cm LN in rt submandibular area (prev 1.5xm in 2013) \par Thyr bed US (4/8/14)- no suspicious lesions. No CELESTE\par Thyr bed US (4/16/13) - no residual/recurrent thyroid tissue.   prior Tg/Tg ab neg.   \par 25-D- wnl.   celiac panel is neg.   \par thyrogen stim WBS (6/21/13)- 0% uptake and undetectable stim Tg/ Tg ab\par \par  H/o:\par Diagnosed with PTC in 04/2012.   S/p total tx on 04/16/12 at [facility] (Dr. Birmingham).   S/p I- 131 ( 125mci ) at [facility] on 06/20/12.   The dose was fluctuating b/w 137- 175 mcg.   Denies family h/o thyroid cancer or history of radiation exposure to head and neck area in a childhood.   \par \par Path: multifocal -  Rt lobe 1.1cm well-diff  PTC with focal extrathyroidal extension, + LV invasion, + 3/7 LN;.   Rt lobe 0.6cm well-diff  PTC, no LV invasion, + extrathyroidal extension.   T3N1Mx.   \par \par Post treatment WBS (6/22/12)-  no evidence of distant mets.   Denies any c/o post ablation tx.

## 2023-02-23 NOTE — ASSESSMENT
[Levothyroxine] : The patient was instructed to take Levothyroxine on an empty stomach, separate from vitamins, and wait at least 30 minutes before eating [FreeTextEntry1] : - discussed poss retrying cytomel and  lower dose of synthroid vs observing on current dose of synthroid\par - decr synthroid 112 mcg 6.5/wk and recheck labs in 2 mos. change of the dose is likely due to the weight loss and coming off OCP's\par - she'll reconsider Tirosint (previously high cost)\par - desired TSH range (0.1-0.5)\par - increase vitamin D3 3,000 IU/day\par -palpable neck LN are likely reactive in nature, rpt neck US in 7/23 . If any further suspicions - for neck CT\par RTC 6 mos, or sooner. labs in 3 months

## 2023-05-22 ENCOUNTER — TRANSCRIPTION ENCOUNTER (OUTPATIENT)
Age: 43
End: 2023-05-22

## 2023-06-11 ENCOUNTER — TRANSCRIPTION ENCOUNTER (OUTPATIENT)
Age: 43
End: 2023-06-11

## 2023-07-07 ENCOUNTER — LABORATORY RESULT (OUTPATIENT)
Age: 43
End: 2023-07-07

## 2023-07-09 ENCOUNTER — TRANSCRIPTION ENCOUNTER (OUTPATIENT)
Age: 43
End: 2023-07-09

## 2023-07-09 LAB
ALBUMIN SERPL ELPH-MCNC: 4.1 G/DL
ALP BLD-CCNC: 118 U/L
ALT SERPL-CCNC: 19 U/L
ANION GAP SERPL CALC-SCNC: 13 MMOL/L
AST SERPL-CCNC: 20 U/L
BILIRUB SERPL-MCNC: 0.2 MG/DL
BUN SERPL-MCNC: 9 MG/DL
CALCIUM SERPL-MCNC: 9.5 MG/DL
CHLORIDE SERPL-SCNC: 107 MMOL/L
CO2 SERPL-SCNC: 24 MMOL/L
CREAT SERPL-MCNC: 0.61 MG/DL
EGFR: 113 ML/MIN/1.73M2
ESTIMATED AVERAGE GLUCOSE: 100 MG/DL
GLUCOSE SERPL-MCNC: 158 MG/DL
HBA1C MFR BLD HPLC: 5.1 %
POTASSIUM SERPL-SCNC: 4.6 MMOL/L
PROT SERPL-MCNC: 6.8 G/DL
SODIUM SERPL-SCNC: 144 MMOL/L
T4 FREE SERPL-MCNC: 1.5 NG/DL
THYROGLOB AB SERPL-ACNC: <20 IU/ML
THYROGLOB SERPL-MCNC: <0.2 NG/ML
TSH SERPL-ACNC: 0.96 UIU/ML

## 2023-07-11 ENCOUNTER — APPOINTMENT (OUTPATIENT)
Dept: INTERNAL MEDICINE | Facility: CLINIC | Age: 43
End: 2023-07-11

## 2023-07-13 ENCOUNTER — TRANSCRIPTION ENCOUNTER (OUTPATIENT)
Age: 43
End: 2023-07-13

## 2023-08-09 ENCOUNTER — TRANSCRIPTION ENCOUNTER (OUTPATIENT)
Age: 43
End: 2023-08-09

## 2023-08-10 ENCOUNTER — TRANSCRIPTION ENCOUNTER (OUTPATIENT)
Age: 43
End: 2023-08-10

## 2023-08-24 ENCOUNTER — APPOINTMENT (OUTPATIENT)
Dept: ENDOCRINOLOGY | Facility: CLINIC | Age: 43
End: 2023-08-24
Payer: COMMERCIAL

## 2023-08-24 VITALS
RESPIRATION RATE: 16 BRPM | DIASTOLIC BLOOD PRESSURE: 62 MMHG | BODY MASS INDEX: 27.35 KG/M2 | HEIGHT: 64 IN | HEART RATE: 60 BPM | WEIGHT: 160.2 LBS | SYSTOLIC BLOOD PRESSURE: 122 MMHG | OXYGEN SATURATION: 99 %

## 2023-08-24 PROCEDURE — 99214 OFFICE O/P EST MOD 30 MIN: CPT

## 2023-08-24 NOTE — HISTORY OF PRESENT ILLNESS
[FreeTextEntry1] : 42 yo female f/u for hypothyroidism/PTC management.   *** Aug 24, 2023 ***  had rough past several months - skin dermatitis, very irregular periods (heavy, skipped 2 weeks, back heavy continuous). had anxiety/panic attacks trirggering visits to ER. no hot flhases, but feels "colder than normal". sleep is worse, interrupted. libido is down, vaginal dryness. very emotional saw gyn- thought to be premenopausal. started on Effexor a month ago with some relief remains on Synthroid 112 mcg labs as below  *** Feb 23, 2023 ***  feels well on Synthroid 112 mcg. works out a lot. lost more weight staying off OCPs. also, on vitamin D3 2,000 IU/day TSH- 0.04, FT4- 1.6, 25D- 28, neg Tg/Tg ab  *** Sep 15, 2022 ***  feels ok overall. started iron pills for anemia. off OCP's since 02/22, cycle is irregular now, heavier recently.  on Synthroid 137 mcg  6.5/wk TSH-0.04, FT4- 1.6 Thyr US (7/29/22)- stable prev bx'ed nodule in the right mid lateral neck region  *** Jan 13, 2022 ***  on Synthroid 137 mcg  6.5/wk TSH- 0.53,  FT4- 1.5, neg tg/tg ab no repeat thyroid US yet  *** Jul 13, 2021 ***  on Synthroid 137 mcg  6.5/wk TSH- 0.54, FT4- 1.5 neg Tg/Tg ab  Thyr US (6/18/21)- 2.1 cm mid right lateral neck region s/p FNA (6/24/21)- favoring reactive lymph node   *** Televisit  Oct 27, 2020 ***  on Synthroid 137 mcg. feels well. TSH- 0.12, FT4- 1.4, neg Tg ab (no Tg are run)  Thyr US (10/16/20)- right posterior cervical chain LN 1.1 +0.8 +1.0 LN with fatty hilum. Smaller in size and c/w reactive CELESTE. previously biopsied right submandibular LN is also unchanged  *** Jul 27, 2020 ***  several weeks ago developed right jaw and neck pain, felt run down, then felt a large right posterior neck LN. SInce then LN got smaller  on Synthroid 125 mcg TSH-1.68, FT4- 1.3 neg Tg/Tg ab 25D- 38  Thyr bed/neck US (6/8/20)- s/p total tx. no suspicious findings. Stable right submandibular LN (prev FNA'd)  ***7/22/19*** took cytomel for 1 month. Felt much better, but had to stop b/o severe anxiety feels ok on synthroid 125mcg TSH- 0.446, FT4- 1.6, T3- 145 Tg < 0.1   ***4/22/19*** still feels tired, mood swings , feels depressed. inquiring re: cytomel/ t4+T3 combo. still  on WW, exercises, but gained 10 lbs over the year. uses CBD oil at bedtime, helps a bit with sleep  still on synthroid 125mcg. Tirosint was expensive.  Thyr US (4/3/19)- s/p total tx. no susp findings. stable Rt submandibular LN ~ 2.0 cm. Thyr US (4/4/18)- s/p total tx. no susp findings Thyr US (4/12/17)- s/p total tx. no susp findings   no depression (was switched to OTC-lo, then back to generic). 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 Using retainers. Started using "Natural calm" (magnesium supplements) with good help.    Seeing Dr. Birmingham yearly TSH - 0.07 <-- 0.02 <--0.04 <--0.02 <-- 0.09 <-- 0.02 <--0.03,  FT4- 1.78 <-- 1.8 <--1.5 <--1.6 <-- 1.6 <-- 1.5 <--1.7 neg tg/tg ab Labs (6/13/13)- TSH 0.02, FT4- 1.6, Neg Tg/Tg ab on 175 mcg of LT4 (brand).      Thyr bed US ( 4/13/16) - s/p total tx. no susp findings. Stable benign reactive 1.7cm LN in the Rt submandibular region.  FNA (4/17/15) of Rt submandibular LN- reactive LN.  Tg from LN aspirate < 1.0    Thyr bed US (4/8/15)- several benign LN adjacent to the thyr bed. prominent 1.9cm LN in rt submandibular area (prev 1.5xm in 2013)  Thyr bed US (4/8/14)- no suspicious lesions. No CELESTE Thyr bed US (4/16/13) - no residual/recurrent thyroid tissue.   prior Tg/Tg ab neg.    25-D- wnl.   celiac panel is neg.    thyrogen stim WBS (6/21/13)- 0% uptake and undetectable stim Tg/ Tg ab   H/o: Diagnosed with PTC in 04/2012.   S/p total tx on 04/16/12 at [facility] (Dr. Birmingham).   S/p I- 131 ( 125mci ) at [facility] on 06/20/12.   The dose was fluctuating b/w 137- 175 mcg.   Denies family h/o thyroid cancer or history of radiation exposure to head and neck area in a childhood.     Path: multifocal -  Rt lobe 1.1cm well-diff  PTC with focal extrathyroidal extension, + LV invasion, + 3/7 LN;.   Rt lobe 0.6cm well-diff  PTC, no LV invasion, + extrathyroidal extension.   T3N1Mx.     Post treatment WBS (6/22/12)-  no evidence of distant mets.   Denies any c/o post ablation tx.

## 2023-08-24 NOTE — ASSESSMENT
[Levothyroxine] : The patient was instructed to take Levothyroxine on an empty stomach, separate from vitamins, and wait at least 30 minutes before eating [FreeTextEntry1] : 1. PTC. Hypothyroidism - cont synthroid 112 mcg  qd - we'll reconsider Tirosint (previously high cost) - desired TSH range (0.1-0.5) -  vitamin D3 3,000 IU/day -palpable neck LN are likely reactive in nature, f/u most recent neck US  . If any further suspicions - for neck CT  2. Likely premenopausal symptoms We reviewed in details current approaches to menopausal symptoms management, including use of Menopausal hormone therapy. Options for OCP with furthre transition to HRT reviewed (R+B), we also discussed  a low-dose vaginal estrogen and  herbal treatments . She'll continue Zoloft for now and will likely uptitrate the dose next week when sees gyn RTC 6 mos, or sooner.

## 2023-08-30 ENCOUNTER — TRANSCRIPTION ENCOUNTER (OUTPATIENT)
Age: 43
End: 2023-08-30

## 2023-09-23 ENCOUNTER — TRANSCRIPTION ENCOUNTER (OUTPATIENT)
Age: 43
End: 2023-09-23

## 2023-09-24 ENCOUNTER — TRANSCRIPTION ENCOUNTER (OUTPATIENT)
Age: 43
End: 2023-09-24

## 2023-09-25 ENCOUNTER — TRANSCRIPTION ENCOUNTER (OUTPATIENT)
Age: 43
End: 2023-09-25

## 2023-09-25 LAB
FERRITIN SERPL-MCNC: 22 NG/ML
FOLATE SERPL-MCNC: 10.8 NG/ML
IRON SATN MFR SERPL: 9 %
IRON SERPL-MCNC: 31 UG/DL
MAGNESIUM SERPL-MCNC: 2 MG/DL
TIBC SERPL-MCNC: 335 UG/DL
UIBC SERPL-MCNC: 303 UG/DL
VIT B12 SERPL-MCNC: >2000 PG/ML

## 2023-09-26 ENCOUNTER — TRANSCRIPTION ENCOUNTER (OUTPATIENT)
Age: 43
End: 2023-09-26

## 2023-09-26 LAB
BASOPHILS # BLD AUTO: 0.03 K/UL
BASOPHILS NFR BLD AUTO: 0.5 %
EOSINOPHIL # BLD AUTO: 0.21 K/UL
EOSINOPHIL NFR BLD AUTO: 3.6 %
HCT VFR BLD CALC: 39.5 %
HGB BLD-MCNC: 13.4 G/DL
IMM GRANULOCYTES NFR BLD AUTO: 0.2 %
LYMPHOCYTES # BLD AUTO: 2.89 K/UL
LYMPHOCYTES NFR BLD AUTO: 49.4 %
MAN DIFF?: NORMAL
MCHC RBC-ENTMCNC: 31.7 PG
MCHC RBC-ENTMCNC: 33.9 GM/DL
MCV RBC AUTO: 93.4 FL
MONOCYTES # BLD AUTO: 0.42 K/UL
MONOCYTES NFR BLD AUTO: 7.2 %
NEUTROPHILS # BLD AUTO: 2.29 K/UL
NEUTROPHILS NFR BLD AUTO: 39.1 %
PLATELET # BLD AUTO: 308 K/UL
RBC # BLD: 4.23 M/UL
RBC # FLD: 12.8 %
WBC # FLD AUTO: 5.85 K/UL

## 2023-09-27 ENCOUNTER — TRANSCRIPTION ENCOUNTER (OUTPATIENT)
Age: 43
End: 2023-09-27

## 2023-09-27 RX ORDER — FERROUS SULFATE 324(65)MG
324 TABLET, DELAYED RELEASE (ENTERIC COATED) ORAL
Qty: 90 | Refills: 0 | Status: ACTIVE | COMMUNITY
Start: 2023-09-27 | End: 1900-01-01

## 2023-09-28 ENCOUNTER — TRANSCRIPTION ENCOUNTER (OUTPATIENT)
Age: 43
End: 2023-09-28

## 2023-09-29 ENCOUNTER — TRANSCRIPTION ENCOUNTER (OUTPATIENT)
Age: 43
End: 2023-09-29

## 2023-10-01 ENCOUNTER — TRANSCRIPTION ENCOUNTER (OUTPATIENT)
Age: 43
End: 2023-10-01

## 2023-10-02 ENCOUNTER — TRANSCRIPTION ENCOUNTER (OUTPATIENT)
Age: 43
End: 2023-10-02

## 2024-01-23 ENCOUNTER — TRANSCRIPTION ENCOUNTER (OUTPATIENT)
Age: 44
End: 2024-01-23

## 2024-02-04 LAB
25(OH)D3 SERPL-MCNC: 31.5 NG/ML
ALBUMIN SERPL ELPH-MCNC: 4.3 G/DL
ALP BLD-CCNC: 117 U/L
ALT SERPL-CCNC: 13 U/L
ANION GAP SERPL CALC-SCNC: 12 MMOL/L
AST SERPL-CCNC: 20 U/L
BASOPHILS # BLD AUTO: 0.04 K/UL
BASOPHILS NFR BLD AUTO: 0.7 %
BILIRUB SERPL-MCNC: 0.2 MG/DL
BUN SERPL-MCNC: 13 MG/DL
CALCIUM SERPL-MCNC: 9.3 MG/DL
CHLORIDE SERPL-SCNC: 103 MMOL/L
CO2 SERPL-SCNC: 26 MMOL/L
CREAT SERPL-MCNC: 0.72 MG/DL
EGFR: 106 ML/MIN/1.73M2
EOSINOPHIL # BLD AUTO: 0.13 K/UL
EOSINOPHIL NFR BLD AUTO: 2.3 %
ESTIMATED AVERAGE GLUCOSE: 94 MG/DL
FERRITIN SERPL-MCNC: 14 NG/ML
FOLATE SERPL-MCNC: 12.3 NG/ML
GLUCOSE SERPL-MCNC: 94 MG/DL
HBA1C MFR BLD HPLC: 4.9 %
HCT VFR BLD CALC: 41.5 %
HGB BLD-MCNC: 14 G/DL
IMM GRANULOCYTES NFR BLD AUTO: 0 %
IRON SATN MFR SERPL: 11 %
IRON SERPL-MCNC: 47 UG/DL
LYMPHOCYTES # BLD AUTO: 2.48 K/UL
LYMPHOCYTES NFR BLD AUTO: 43.7 %
MAN DIFF?: NORMAL
MCHC RBC-ENTMCNC: 31.7 PG
MCHC RBC-ENTMCNC: 33.7 GM/DL
MCV RBC AUTO: 94.1 FL
MONOCYTES # BLD AUTO: 0.48 K/UL
MONOCYTES NFR BLD AUTO: 8.5 %
NEUTROPHILS # BLD AUTO: 2.54 K/UL
NEUTROPHILS NFR BLD AUTO: 44.8 %
PLATELET # BLD AUTO: 303 K/UL
POTASSIUM SERPL-SCNC: 4.6 MMOL/L
PROT SERPL-MCNC: 6.9 G/DL
RBC # BLD: 4.41 M/UL
RBC # FLD: 13.3 %
SODIUM SERPL-SCNC: 141 MMOL/L
T3 SERPL-MCNC: 85 NG/DL
T4 FREE SERPL-MCNC: 1.5 NG/DL
THYROGLOB AB SERPL-ACNC: <20 IU/ML
THYROGLOB SERPL-MCNC: 1.1 NG/ML
TIBC SERPL-MCNC: 411 UG/DL
TSH SERPL-ACNC: 0.47 UIU/ML
UIBC SERPL-MCNC: 364 UG/DL
VIT B12 SERPL-MCNC: >2000 PG/ML
WBC # FLD AUTO: 5.67 K/UL

## 2024-02-07 ENCOUNTER — APPOINTMENT (OUTPATIENT)
Dept: ENDOCRINOLOGY | Facility: CLINIC | Age: 44
End: 2024-02-07
Payer: COMMERCIAL

## 2024-02-07 VITALS
RESPIRATION RATE: 16 BRPM | SYSTOLIC BLOOD PRESSURE: 116 MMHG | OXYGEN SATURATION: 99 % | DIASTOLIC BLOOD PRESSURE: 78 MMHG | BODY MASS INDEX: 29.02 KG/M2 | HEART RATE: 77 BPM | HEIGHT: 64 IN | WEIGHT: 170 LBS | TEMPERATURE: 98 F

## 2024-02-07 DIAGNOSIS — N95.1 MENOPAUSAL AND FEMALE CLIMACTERIC STATES: ICD-10-CM

## 2024-02-07 DIAGNOSIS — D64.9 ANEMIA, UNSPECIFIED: ICD-10-CM

## 2024-02-07 DIAGNOSIS — C73 MALIGNANT NEOPLASM OF THYROID GLAND: ICD-10-CM

## 2024-02-07 DIAGNOSIS — E03.9 HYPOTHYROIDISM, UNSPECIFIED: ICD-10-CM

## 2024-02-07 PROCEDURE — G2211 COMPLEX E/M VISIT ADD ON: CPT

## 2024-02-07 PROCEDURE — 99214 OFFICE O/P EST MOD 30 MIN: CPT

## 2024-02-07 NOTE — HISTORY OF PRESENT ILLNESS
[FreeTextEntry1] : 44 yo female f/u for hypothyroidism/PTC management.   *** Feb 07, 2024 ***  feels better than last time, staying on Zoloft . occasional palpitations. no hot flashes gained weight, more cravings, feels hungry still thinking of HRT on and off iron supplements b/o constipation on them had her 1st colonoscopy- normal. labs reviewed as below on Synthroid 112 mcg   *** Aug 24, 2023 ***  had rough past several months - skin dermatitis, very irregular periods (heavy, skipped 2 weeks, back heavy continuous). had anxiety/panic attacks triggering visits to ER. no hot flashes, but feels "colder than normal". sleep is worse, interrupted. libido is down, vaginal dryness. very emotional saw gyn- thought to be premenopausal. started on Effexor a month ago with some relief remains on Synthroid 112 mcg labs as below  *** Feb 23, 2023 ***  feels well on Synthroid 112 mcg. works out a lot. lost more weight staying off OCPs. also, on vitamin D3 2,000 IU/day TSH- 0.04, FT4- 1.6, 25D- 28, neg Tg/Tg ab  *** Sep 15, 2022 ***  feels ok overall. started iron pills for anemia. off OCP's since 02/22, cycle is irregular now, heavier recently.  on Synthroid 137 mcg  6.5/wk TSH-0.04, FT4- 1.6 Thyr US (7/29/22)- stable prev bx'ed nodule in the right mid lateral neck region  *** Jan 13, 2022 ***  on Synthroid 137 mcg  6.5/wk TSH- 0.53,  FT4- 1.5, neg tg/tg ab no repeat thyroid US yet  *** Jul 13, 2021 ***  on Synthroid 137 mcg  6.5/wk TSH- 0.54, FT4- 1.5 neg Tg/Tg ab  Thyr US (6/18/21)- 2.1 cm mid right lateral neck region s/p FNA (6/24/21)- favoring reactive lymph node   *** Televisit  Oct 27, 2020 ***  on Synthroid 137 mcg. feels well. TSH- 0.12, FT4- 1.4, neg Tg ab (no Tg are run)  Thyr US (10/16/20)- right posterior cervical chain LN 1.1 +0.8 +1.0 LN with fatty hilum. Smaller in size and c/w reactive CELESTE. previously biopsied right submandibular LN is also unchanged  *** Jul 27, 2020 ***  several weeks ago developed right jaw and neck pain, felt run down, then felt a large right posterior neck LN. SInce then LN got smaller  on Synthroid 125 mcg TSH-1.68, FT4- 1.3 neg Tg/Tg ab 25D- 38  Thyr bed/neck US (6/8/20)- s/p total tx. no suspicious findings. Stable right submandibular LN (prev FNA'd)  ***7/22/19*** took cytomel for 1 month. Felt much better, but had to stop b/o severe anxiety feels ok on synthroid 125mcg TSH- 0.446, FT4- 1.6, T3- 145 Tg < 0.1   ***4/22/19*** still feels tired, mood swings , feels depressed. inquiring re: cytomel/ t4+T3 combo. still  on WW, exercises, but gained 10 lbs over the year. uses CBD oil at bedtime, helps a bit with sleep  still on synthroid 125mcg. Tirosint was expensive.  Thyr US (4/3/19)- s/p total tx. no susp findings. stable Rt submandibular LN ~ 2.0 cm. Thyr US (4/4/18)- s/p total tx. no susp findings Thyr US (4/12/17)- s/p total tx. no susp findings   no depression (was switched to OTC-lo, then back to generic). 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 Using retainers. Started using "Natural calm" (magnesium supplements) with good help.    Seeing Dr. Birmingham yearly TSH - 0.07 <-- 0.02 <--0.04 <--0.02 <-- 0.09 <-- 0.02 <--0.03,  FT4- 1.78 <-- 1.8 <--1.5 <--1.6 <-- 1.6 <-- 1.5 <--1.7 neg tg/tg ab Labs (6/13/13)- TSH 0.02, FT4- 1.6, Neg Tg/Tg ab on 175 mcg of LT4 (brand).      Thyr bed US ( 4/13/16) - s/p total tx. no susp findings. Stable benign reactive 1.7cm LN in the Rt submandibular region.  FNA (4/17/15) of Rt submandibular LN- reactive LN.  Tg from LN aspirate < 1.0    Thyr bed US (4/8/15)- several benign LN adjacent to the thyr bed. prominent 1.9cm LN in rt submandibular area (prev 1.5xm in 2013)  Thyr bed US (4/8/14)- no suspicious lesions. No CELESTE Thyr bed US (4/16/13) - no residual/recurrent thyroid tissue.   prior Tg/Tg ab neg.    25-D- wnl.   celiac panel is neg.    thyrogen stim WBS (6/21/13)- 0% uptake and undetectable stim Tg/ Tg ab   H/o: Diagnosed with PTC in 04/2012.   S/p total tx on 04/16/12 at [facility] (Dr. Birmingham).   S/p I- 131 ( 125mci ) at [facility] on 06/20/12.   The dose was fluctuating b/w 137- 175 mcg.   Denies family h/o thyroid cancer or history of radiation exposure to head and neck area in a childhood.     Path: multifocal -  Rt lobe 1.1cm well-diff  PTC with focal extrathyroidal extension, + LV invasion, + 3/7 LN;.   Rt lobe 0.6cm well-diff  PTC, no LV invasion, + extrathyroidal extension.   T3N1Mx.     Post treatment WBS (6/22/12)-  no evidence of distant mets.   Denies any c/o post ablation tx.

## 2024-02-07 NOTE — ASSESSMENT
[Levothyroxine] : The patient was instructed to take Levothyroxine on an empty stomach, separate from vitamins, and wait at least 30 minutes before eating [FreeTextEntry1] : 1. PTC. Hypothyroidism - cont synthroid 112 mcg qd - we'll reconsider Tirosint (previously high cost) - desired TSH range (0.1-0.5) - vitamin D3 3,000 IU/day - palpable neck LN are likely reactive in nature, f/u most recent neck US. If any further suspicions - for neck CT - The patient was instructed to take Levothyroxine on an empty stomach, separate from vitamins, and wait at least 30 minutes before eating.  2. Likely premenopausal symptoms We reviewed in details current approaches to menopausal symptoms management, including use of Menopausal hormone therapy. Options for OCP with further transition to HRT reviewed (R+B), we also discussed a low-dose vaginal estrogen and  herbal treatments. She'll continue Zoloft for now   3. Weight gain - Current approaches to weight management are discussed with the patient.  Suggested extensive nutritional education program. Proper dietary restrictions and exercise routines discussed. Medical weight loss therapies were reviewed with the patient. Would avoid phentermine containing for now in view of h/o palpitations. Advised on GLP1RA (R+B), patient wants continue with LSM changes  RTC 6 mos, or sooner.

## 2024-06-13 ENCOUNTER — TRANSCRIPTION ENCOUNTER (OUTPATIENT)
Age: 44
End: 2024-06-13

## 2024-06-13 DIAGNOSIS — R63.5 ABNORMAL WEIGHT GAIN: ICD-10-CM

## 2024-06-14 ENCOUNTER — TRANSCRIPTION ENCOUNTER (OUTPATIENT)
Age: 44
End: 2024-06-14

## 2024-06-16 ENCOUNTER — TRANSCRIPTION ENCOUNTER (OUTPATIENT)
Age: 44
End: 2024-06-16

## 2024-06-16 LAB
ALBUMIN SERPL ELPH-MCNC: 4.2 G/DL
ALP BLD-CCNC: 100 U/L
ALT SERPL-CCNC: 18 U/L
ANION GAP SERPL CALC-SCNC: 13 MMOL/L
AST SERPL-CCNC: 22 U/L
BILIRUB SERPL-MCNC: 0.3 MG/DL
BUN SERPL-MCNC: 14 MG/DL
C PEPTIDE SERPL-MCNC: 2.1 NG/ML
CALCIUM SERPL-MCNC: 9.5 MG/DL
CHLORIDE SERPL-SCNC: 106 MMOL/L
CO2 SERPL-SCNC: 24 MMOL/L
CREAT SERPL-MCNC: 0.74 MG/DL
EGFR: 102 ML/MIN/1.73M2
ESTIMATED AVERAGE GLUCOSE: 97 MG/DL
ESTRADIOL SERPL-MCNC: 97 PG/ML
FSH SERPL-MCNC: 6.8 IU/L
GLUCOSE SERPL-MCNC: 98 MG/DL
HBA1C MFR BLD HPLC: 5 %
HCG SERPL-MCNC: <1 MIU/ML
INSULIN P FAST SERPL-ACNC: 10.2 UU/ML
LH SERPL-ACNC: 12.3 IU/L
POTASSIUM SERPL-SCNC: 5.1 MMOL/L
PROT SERPL-MCNC: 7.1 G/DL
SODIUM SERPL-SCNC: 143 MMOL/L
T3 SERPL-MCNC: 93 NG/DL
T4 FREE SERPL-MCNC: 1.4 NG/DL
THYROGLOB AB SERPL-ACNC: <20 IU/ML
THYROGLOB SERPL-MCNC: <0.2 NG/ML
TSH SERPL-ACNC: 1.06 UIU/ML

## 2024-06-17 ENCOUNTER — TRANSCRIPTION ENCOUNTER (OUTPATIENT)
Age: 44
End: 2024-06-17

## 2024-08-21 ENCOUNTER — RX RENEWAL (OUTPATIENT)
Age: 44
End: 2024-08-21

## 2024-08-28 ENCOUNTER — TRANSCRIPTION ENCOUNTER (OUTPATIENT)
Age: 44
End: 2024-08-28

## 2024-10-12 NOTE — REASON FOR VISIT
[Follow-Up: _____] : a [unfilled] follow-up visit
Normal vision: sees adequately in most situations; can see medication labels, newsprint

## 2024-10-16 ENCOUNTER — APPOINTMENT (OUTPATIENT)
Dept: ENDOCRINOLOGY | Facility: CLINIC | Age: 44
End: 2024-10-16

## 2024-10-16 VITALS — BODY MASS INDEX: 29.88 KG/M2 | HEIGHT: 64 IN | WEIGHT: 175 LBS

## 2024-10-16 DIAGNOSIS — C73 MALIGNANT NEOPLASM OF THYROID GLAND: ICD-10-CM

## 2024-10-16 DIAGNOSIS — E03.9 HYPOTHYROIDISM, UNSPECIFIED: ICD-10-CM

## 2024-10-16 DIAGNOSIS — N95.1 MENOPAUSAL AND FEMALE CLIMACTERIC STATES: ICD-10-CM

## 2024-10-16 DIAGNOSIS — E66.9 OBESITY, UNSPECIFIED: ICD-10-CM

## 2024-10-16 PROCEDURE — 99442: CPT

## 2024-10-16 RX ORDER — LIOTHYRONINE SODIUM 5 UG/1
5 TABLET ORAL DAILY
Qty: 90 | Refills: 2 | Status: ACTIVE | COMMUNITY
Start: 2024-10-16 | End: 1900-01-01

## 2024-10-22 ENCOUNTER — TRANSCRIPTION ENCOUNTER (OUTPATIENT)
Age: 44
End: 2024-10-22

## 2025-01-10 ENCOUNTER — TRANSCRIPTION ENCOUNTER (OUTPATIENT)
Age: 45
End: 2025-01-10

## 2025-03-03 ENCOUNTER — TRANSCRIPTION ENCOUNTER (OUTPATIENT)
Age: 45
End: 2025-03-03

## 2025-05-30 ENCOUNTER — TRANSCRIPTION ENCOUNTER (OUTPATIENT)
Age: 45
End: 2025-05-30

## 2025-06-02 ENCOUNTER — TRANSCRIPTION ENCOUNTER (OUTPATIENT)
Age: 45
End: 2025-06-02

## 2025-06-10 ENCOUNTER — TRANSCRIPTION ENCOUNTER (OUTPATIENT)
Age: 45
End: 2025-06-10

## 2025-06-12 ENCOUNTER — TRANSCRIPTION ENCOUNTER (OUTPATIENT)
Age: 45
End: 2025-06-12

## 2025-06-18 ENCOUNTER — RX RENEWAL (OUTPATIENT)
Age: 45
End: 2025-06-18

## 2025-06-19 ENCOUNTER — TRANSCRIPTION ENCOUNTER (OUTPATIENT)
Age: 45
End: 2025-06-19

## 2025-06-30 ENCOUNTER — NON-APPOINTMENT (OUTPATIENT)
Age: 45
End: 2025-06-30

## 2025-07-02 ENCOUNTER — APPOINTMENT (OUTPATIENT)
Dept: ENDOCRINOLOGY | Facility: CLINIC | Age: 45
End: 2025-07-02
Payer: COMMERCIAL

## 2025-07-02 VITALS
OXYGEN SATURATION: 96 % | WEIGHT: 181 LBS | SYSTOLIC BLOOD PRESSURE: 107 MMHG | DIASTOLIC BLOOD PRESSURE: 69 MMHG | BODY MASS INDEX: 30.9 KG/M2 | RESPIRATION RATE: 16 BRPM | HEIGHT: 64 IN | HEART RATE: 75 BPM

## 2025-07-02 PROCEDURE — 36415 COLL VENOUS BLD VENIPUNCTURE: CPT

## 2025-07-02 PROCEDURE — 99214 OFFICE O/P EST MOD 30 MIN: CPT

## 2025-07-02 PROCEDURE — G2211 COMPLEX E/M VISIT ADD ON: CPT | Mod: NC

## 2025-07-03 ENCOUNTER — TRANSCRIPTION ENCOUNTER (OUTPATIENT)
Age: 45
End: 2025-07-03

## 2025-07-03 LAB
25(OH)D3 SERPL-MCNC: 40 NG/ML
ALBUMIN SERPL ELPH-MCNC: 4.3 G/DL
ALP BLD-CCNC: 111 U/L
ALT SERPL-CCNC: 17 U/L
ANION GAP SERPL CALC-SCNC: 16 MMOL/L
AST SERPL-CCNC: 23 U/L
BILIRUB SERPL-MCNC: 0.3 MG/DL
BUN SERPL-MCNC: 17 MG/DL
CALCIUM SERPL-MCNC: 9.5 MG/DL
CHLORIDE SERPL-SCNC: 100 MMOL/L
CO2 SERPL-SCNC: 23 MMOL/L
CREAT SERPL-MCNC: 0.61 MG/DL
EGFRCR SERPLBLD CKD-EPI 2021: 112 ML/MIN/1.73M2
ESTIMATED AVERAGE GLUCOSE: 100 MG/DL
GLUCOSE SERPL-MCNC: 75 MG/DL
HBA1C MFR BLD HPLC: 5.1 %
POTASSIUM SERPL-SCNC: 4.6 MMOL/L
PROT SERPL-MCNC: 7.1 G/DL
SODIUM SERPL-SCNC: 139 MMOL/L
THYROGLOB AB SERPL-ACNC: 21.2 IU/ML
THYROGLOB SERPL-MCNC: <0.1 NG/ML

## 2025-07-03 RX ORDER — TIRZEPATIDE 2.5 MG/.5ML
2.5 INJECTION, SOLUTION SUBCUTANEOUS
Qty: 2 | Refills: 2 | Status: ACTIVE | COMMUNITY
Start: 2025-07-02

## 2025-07-07 ENCOUNTER — TRANSCRIPTION ENCOUNTER (OUTPATIENT)
Age: 45
End: 2025-07-07

## 2025-07-24 ENCOUNTER — APPOINTMENT (OUTPATIENT)
Facility: HOSPITAL | Age: 45
End: 2025-07-24
Payer: COMMERCIAL

## 2025-07-24 ENCOUNTER — OUTPATIENT (OUTPATIENT)
Dept: OUTPATIENT SERVICES | Facility: HOSPITAL | Age: 45
LOS: 1 days | End: 2025-07-24

## 2025-07-24 DIAGNOSIS — G47.30 SLEEP APNEA, UNSPECIFIED: ICD-10-CM

## 2025-07-24 PROCEDURE — 95810 POLYSOM 6/> YRS 4/> PARAM: CPT | Mod: 26

## 2025-07-29 ENCOUNTER — TRANSCRIPTION ENCOUNTER (OUTPATIENT)
Age: 45
End: 2025-07-29

## 2025-08-08 ENCOUNTER — TRANSCRIPTION ENCOUNTER (OUTPATIENT)
Age: 45
End: 2025-08-08

## 2025-08-12 ENCOUNTER — TRANSCRIPTION ENCOUNTER (OUTPATIENT)
Age: 45
End: 2025-08-12

## 2025-08-14 ENCOUNTER — TRANSCRIPTION ENCOUNTER (OUTPATIENT)
Age: 45
End: 2025-08-14

## 2025-08-25 ENCOUNTER — TRANSCRIPTION ENCOUNTER (OUTPATIENT)
Age: 45
End: 2025-08-25

## 2025-09-03 ENCOUNTER — TRANSCRIPTION ENCOUNTER (OUTPATIENT)
Age: 45
End: 2025-09-03

## 2025-09-11 ENCOUNTER — TRANSCRIPTION ENCOUNTER (OUTPATIENT)
Age: 45
End: 2025-09-11

## 2025-09-16 ENCOUNTER — TRANSCRIPTION ENCOUNTER (OUTPATIENT)
Age: 45
End: 2025-09-16

## 2025-09-17 ENCOUNTER — APPOINTMENT (OUTPATIENT)
Dept: ENDOCRINOLOGY | Facility: CLINIC | Age: 45
End: 2025-09-17
Payer: COMMERCIAL

## 2025-09-17 VITALS — HEIGHT: 64 IN | BODY MASS INDEX: 31.92 KG/M2 | WEIGHT: 187 LBS

## 2025-09-17 DIAGNOSIS — E03.9 HYPOTHYROIDISM, UNSPECIFIED: ICD-10-CM

## 2025-09-17 DIAGNOSIS — N95.1 MENOPAUSAL AND FEMALE CLIMACTERIC STATES: ICD-10-CM

## 2025-09-17 DIAGNOSIS — J45.909 UNSPECIFIED ASTHMA, UNCOMPLICATED: ICD-10-CM

## 2025-09-17 DIAGNOSIS — C73 MALIGNANT NEOPLASM OF THYROID GLAND: ICD-10-CM

## 2025-09-17 DIAGNOSIS — E66.9 OBESITY, UNSPECIFIED: ICD-10-CM

## 2025-09-17 PROCEDURE — 99213 OFFICE O/P EST LOW 20 MIN: CPT | Mod: 95

## 2025-09-19 ENCOUNTER — TRANSCRIPTION ENCOUNTER (OUTPATIENT)
Age: 45
End: 2025-09-19